# Patient Record
Sex: MALE | Employment: FULL TIME | URBAN - METROPOLITAN AREA
[De-identification: names, ages, dates, MRNs, and addresses within clinical notes are randomized per-mention and may not be internally consistent; named-entity substitution may affect disease eponyms.]

---

## 2024-06-14 PROBLEM — E66.01 MORBID OBESITY WITH BODY MASS INDEX (BMI) OF 40.0 TO 49.9 (MULTI): Status: ACTIVE | Noted: 2021-04-26

## 2024-06-14 PROBLEM — J45.40 MODERATE PERSISTENT ASTHMA WITHOUT COMPLICATION (HHS-HCC): Status: ACTIVE | Noted: 2021-01-06

## 2024-06-14 PROBLEM — J45.901 MODERATE ASTHMA WITH EXACERBATION (HHS-HCC): Status: ACTIVE | Noted: 2020-11-23

## 2024-06-14 PROBLEM — G56.20 CUBITAL TUNNEL SYNDROME: Status: ACTIVE | Noted: 2019-01-23

## 2024-06-14 PROBLEM — I48.0 PAROXYSMAL ATRIAL FIBRILLATION (MULTI): Status: ACTIVE | Noted: 2021-04-26

## 2024-06-14 PROBLEM — I10 ESSENTIAL HYPERTENSION: Status: ACTIVE | Noted: 2021-04-26

## 2024-06-14 PROBLEM — D18.00 GLOMUS TUMOR: Status: ACTIVE | Noted: 2021-02-15

## 2024-06-14 PROBLEM — G56.80: Status: ACTIVE | Noted: 2019-01-23

## 2024-06-14 PROBLEM — L40.4 GUTTATE PSORIASIS: Status: ACTIVE | Noted: 2021-07-12

## 2024-06-14 PROBLEM — S83.242A ACUTE MEDIAL MENISCUS TEAR OF LEFT KNEE: Status: ACTIVE | Noted: 2023-02-07

## 2024-06-14 PROBLEM — S62.009A: Status: ACTIVE | Noted: 2020-10-19

## 2024-06-14 PROBLEM — G47.33 OSA ON CPAP: Status: ACTIVE | Noted: 2021-04-26

## 2024-06-14 PROBLEM — M54.12 CERVICAL RADICULOPATHY: Status: ACTIVE | Noted: 2020-10-19

## 2024-06-14 RX ORDER — ICOSAPENT ETHYL 1 G/1
2 CAPSULE ORAL
COMMUNITY

## 2024-06-14 RX ORDER — HYDROCHLOROTHIAZIDE 25 MG/1
1 TABLET ORAL DAILY
COMMUNITY
Start: 2022-09-07

## 2024-06-14 RX ORDER — ALPRAZOLAM 0.5 MG/1
1 TABLET ORAL 2 TIMES DAILY
COMMUNITY
Start: 2022-09-07

## 2024-06-14 RX ORDER — SOTALOL HYDROCHLORIDE 80 MG/1
80 TABLET ORAL EVERY 12 HOURS
COMMUNITY

## 2024-06-14 RX ORDER — GLUCOSAMINE/MSM/CHONDROIT SULF 500-166.6
1 TABLET ORAL DAILY
COMMUNITY

## 2024-06-14 RX ORDER — AMLODIPINE BESYLATE 10 MG/1
10 TABLET ORAL DAILY
COMMUNITY

## 2024-06-14 RX ORDER — ROSUVASTATIN CALCIUM 20 MG/1
20 TABLET, COATED ORAL DAILY
COMMUNITY
Start: 2022-11-16

## 2024-06-14 RX ORDER — FLUTICASONE PROPIONATE AND SALMETEROL XINAFOATE 115; 21 UG/1; UG/1
2 AEROSOL, METERED RESPIRATORY (INHALATION) 2 TIMES DAILY
COMMUNITY
Start: 2023-08-07 | End: 2024-08-06

## 2024-06-14 RX ORDER — MELOXICAM 15 MG/1
15 TABLET ORAL DAILY
COMMUNITY

## 2024-06-14 RX ORDER — OMEPRAZOLE 40 MG/1
40 CAPSULE, DELAYED RELEASE ORAL
COMMUNITY
Start: 2022-10-24

## 2024-06-14 RX ORDER — ASPIRIN 325 MG
325 TABLET ORAL
COMMUNITY

## 2024-06-14 RX ORDER — LOSARTAN POTASSIUM 100 MG/1
1 TABLET ORAL DAILY
COMMUNITY
Start: 2022-07-08

## 2024-06-14 NOTE — PROGRESS NOTES
[facility] Heart and Vascular Electrophysiology    Patient Name: Kenroy Manriquez  Patient : 1970    Referred  for   Chief Complaint   Patient presents with    New Patient Visit        Kenroy Manriquez is a 53 y.o. year old male patient with    Paroxysmal atrial fibrillation: Diagnosed about 20 years ago. He would frequently need to go to the ER and receive IV cardizem. Never had a cardioversion. Started on sotalol 80mg bid and has had no issues in the past 12-14 years. No dizziness/lightheadedness/palpitations.  HTN  DLD      Past Medical History:  He has a past medical history of Asthma ([facility]), Atrial fibrillation (Multi), Crohn disease (Multi), GERD (gastroesophageal reflux disease), Hyperlipidemia, Hypertension, Osteoarthritis, and Sleep apnea.    Past Surgical History:  He has no past surgical history on file.      Social History:  He reports that he has never smoked. He has never used smokeless tobacco. He reports that he does not currently use alcohol. He reports that he does not use drugs.    Family History:  No family history on file.     Allergies:  Other    Outpatient Medications:  Current Outpatient Medications   Medication Instructions    ALPRAZolam (Xanax) 0.5 mg tablet 1 tablet, oral, 2 times daily    amLODIPine (NORVASC) 10 mg, oral, Daily    aspirin 325 mg, oral, Daily RT    fluticasone propion-salmeteroL (Advair) 115-21 mcg/actuation inhaler 2 puffs, inhalation, 2 times daily    hydroCHLOROthiazide (HYDRODiuril) 25 mg tablet 1 tablet, oral, Daily    icosapent ethyL (VASCEPA) 2 g, oral, 2 times daily (morning and late afternoon)    losartan (Cozaar) 100 mg tablet 1 tablet, oral, Daily    meloxicam (MOBIC) 15 mg, oral, Daily    omega 3-dha-epa-fish oil 360 mg-108 mg- 180 mg-1,200 mg capsule 1 tablet, oral, Daily    omeprazole (PRILOSEC) 40 mg, oral, Daily before breakfast    rosuvastatin (CRESTOR) 20 mg, oral, Daily    sotalol (BETAPACE) 80 mg, oral, Every 12 hours        ROS:  A 14 point  "review of systems was done and is negative other than as stated in HPI    Vitals:  Vitals:    06/27/24 1055   BP: 144/85   Pulse: 63   Weight: 104 kg (230 lb)   Height: 1.676 m (5' 6\")       Physical Exam:   Physical Exam  Constitutional:       General: He is not in acute distress.     Appearance: Normal appearance.   Cardiovascular:      Rate and Rhythm: Normal rate and regular rhythm.   Musculoskeletal:         General: No swelling.   Skin:     General: Skin is warm and dry.   Neurological:      Mental Status: He is alert.          Intake/Output:   No intake/output data recorded.    Outpatient Medications  Current Outpatient Medications on File Prior to Visit   Medication Sig Dispense Refill    ALPRAZolam (Xanax) 0.5 mg tablet Take 1 tablet (0.5 mg) by mouth 2 times a day.      amLODIPine (Norvasc) 10 mg tablet Take 1 tablet (10 mg) by mouth once daily.      aspirin 325 mg tablet Take 1 tablet (325 mg) by mouth once daily.      fluticasone propion-salmeteroL (Advair) 115-21 mcg/actuation inhaler Inhale 2 puffs 2 times a day.      hydroCHLOROthiazide (HYDRODiuril) 25 mg tablet Take 1 tablet (25 mg) by mouth once daily.      icosapent ethyL (Vascepa) 1 gram capsule Take 2 capsules (2 g) by mouth 2 times daily (morning and late afternoon).      losartan (Cozaar) 100 mg tablet Take 1 tablet (100 mg) by mouth once daily.      meloxicam (Mobic) 15 mg tablet Take 1 tablet (15 mg) by mouth once daily.      omega 3-dha-epa-fish oil 360 mg-108 mg- 180 mg-1,200 mg capsule Take 1 tablet by mouth once daily.      omeprazole (PriLOSEC) 40 mg DR capsule Take 1 capsule (40 mg) by mouth once daily in the morning. Take before meals.      rosuvastatin (Crestor) 20 mg tablet Take 1 tablet (20 mg) by mouth once daily.      sotalol (Betapace) 80 mg tablet Take 1 tablet (80 mg) by mouth every 12 hours.       No current facility-administered medications on file prior to visit.       Labs: (past 26 weeks)  No results found for this or any " previous visit (from the past 4368 hour(s)).    ECG  No results found for this or any previous visit (from the past 4464 hour(s)).    Echocardiogram  No results found for this or any previous visit from the past 1095 days.      Assessment/Plan:     Patient with prior history of PAF, has been on Sotalol for >12 years. No recurrences since. Establishing with new cardiology team and us. Since then he is using a CPAP and has lost significant weight. I think it is reasonable to attempt to stop sotalol. If there are recurrent arrhythmias we can always verify with monitor and restart.

## 2024-06-27 ENCOUNTER — APPOINTMENT (OUTPATIENT)
Dept: CARDIOLOGY | Facility: CLINIC | Age: 54
End: 2024-06-27
Payer: COMMERCIAL

## 2024-06-27 VITALS
DIASTOLIC BLOOD PRESSURE: 85 MMHG | BODY MASS INDEX: 36.96 KG/M2 | WEIGHT: 230 LBS | SYSTOLIC BLOOD PRESSURE: 144 MMHG | HEART RATE: 63 BPM | HEIGHT: 66 IN

## 2024-06-27 DIAGNOSIS — I48.0 PAROXYSMAL ATRIAL FIBRILLATION (MULTI): Primary | ICD-10-CM

## 2024-06-27 PROCEDURE — 99204 OFFICE O/P NEW MOD 45 MIN: CPT | Performed by: INTERNAL MEDICINE

## 2024-06-27 PROCEDURE — 3077F SYST BP >= 140 MM HG: CPT | Performed by: INTERNAL MEDICINE

## 2024-06-27 PROCEDURE — 3079F DIAST BP 80-89 MM HG: CPT | Performed by: INTERNAL MEDICINE

## 2024-06-27 PROCEDURE — 1036F TOBACCO NON-USER: CPT | Performed by: INTERNAL MEDICINE

## 2024-06-27 PROCEDURE — 93000 ELECTROCARDIOGRAM COMPLETE: CPT | Performed by: INTERNAL MEDICINE

## 2024-06-27 NOTE — PROGRESS NOTES
"  [facility] Heart and Vascular Electrophysiology    Patient Name: Kenroy Manriquez  Patient : 1970    Referred for      Kenroy Manriquez is a 53 y.o. year old male patient with        Past Medical History:  He has a past medical history of Asthma ([facility]), Atrial fibrillation (Multi), Crohn disease (Multi), GERD (gastroesophageal reflux disease), Hyperlipidemia, Hypertension, Osteoarthritis, and Sleep apnea.    Past Surgical History:  He has no past surgical history on file.      Social History:  He reports that he has never smoked. He has never used smokeless tobacco. He reports that he does not currently use alcohol. He reports that he does not use drugs.    Family History:  No family history on file.     Allergies:  Other    Outpatient Medications:  Current Outpatient Medications   Medication Instructions    ALPRAZolam (Xanax) 0.5 mg tablet 1 tablet, oral, 2 times daily    amLODIPine (NORVASC) 10 mg, oral, Daily    aspirin 325 mg, oral, Daily RT    fluticasone propion-salmeteroL (Advair) 115-21 mcg/actuation inhaler 2 puffs, inhalation, 2 times daily    hydroCHLOROthiazide (HYDRODiuril) 25 mg tablet 1 tablet, oral, Daily    icosapent ethyL (VASCEPA) 2 g, oral, 2 times daily (morning and late afternoon)    losartan (Cozaar) 100 mg tablet 1 tablet, oral, Daily    meloxicam (MOBIC) 15 mg, oral, Daily    omega 3-dha-epa-fish oil 360 mg-108 mg- 180 mg-1,200 mg capsule 1 tablet, oral, Daily    omeprazole (PRILOSEC) 40 mg, oral, Daily before breakfast    rosuvastatin (CRESTOR) 20 mg, oral, Daily    sotalol (BETAPACE) 80 mg, oral, Every 12 hours        ROS:  A 14 point review of systems was done and is negative other than as stated in HPI    Vitals:  There were no vitals filed for this visit.    Physical Exam     Labs:   No results found for: \"WBC\", \"HGB\", \"HCT\", \"PLT\", \"ALT\", \"AST\", \"NA\", \"K\", \"CL\", \"CREATININE\", \"BUN\", \"CO2\", \"TSH\", \"INR\", \"GLUF\"     Cardiac Testing:  ECG  No results found for this or any previous " visit (from the past 4464 hour(s)).    Echocardiogram      Assessment:       Plan:

## 2024-06-28 NOTE — PROGRESS NOTES
[facility] Heart and Vascular Electrophysiology    Patient Name: Kenroy Manriquez  Patient : 1970    Referred  for   Chief Complaint   Patient presents with    New Patient Visit        Kenroy Manriquez is a 53 y.o. year old male patient with    Paroxysmal atrial fibrillation: Diagnosed about 20 years ago. He would frequently need to go to the ER and receive IV cardizem. Never had a cardioversion. Started on sotalol 80mg bid and has had no issues in the past 12-14 years. No dizziness/lightheadedness/palpitations.  HTN  DLD      Past Medical History:  He has a past medical history of Asthma ([facility]), Atrial fibrillation (Multi), Crohn disease (Multi), GERD (gastroesophageal reflux disease), Hyperlipidemia, Hypertension, Osteoarthritis, and Sleep apnea.    Past Surgical History:  He has no past surgical history on file.      Social History:  He reports that he has never smoked. He has never used smokeless tobacco. He reports that he does not currently use alcohol. He reports that he does not use drugs.    Family History:  No family history on file.     Allergies:  Other    Outpatient Medications:  Current Outpatient Medications   Medication Instructions    ALPRAZolam (Xanax) 0.5 mg tablet 1 tablet, oral, 2 times daily    amLODIPine (NORVASC) 10 mg, oral, Daily    aspirin 325 mg, oral, Daily RT    fluticasone propion-salmeteroL (Advair) 115-21 mcg/actuation inhaler 2 puffs, inhalation, 2 times daily    hydroCHLOROthiazide (HYDRODiuril) 25 mg tablet 1 tablet, oral, Daily    icosapent ethyL (VASCEPA) 2 g, oral, 2 times daily (morning and late afternoon)    losartan (Cozaar) 100 mg tablet 1 tablet, oral, Daily    meloxicam (MOBIC) 15 mg, oral, Daily    omega 3-dha-epa-fish oil 360 mg-108 mg- 180 mg-1,200 mg capsule 1 tablet, oral, Daily    omeprazole (PRILOSEC) 40 mg, oral, Daily before breakfast    rosuvastatin (CRESTOR) 20 mg, oral, Daily        ROS:  A 14 point review of systems was done and is negative other than  "as stated in HPI    Vitals:  Vitals:    06/27/24 1055   BP: 144/85   Pulse: 63   Weight: 104 kg (230 lb)   Height: 1.676 m (5' 6\")       Physical Exam:   Physical Exam  Constitutional:       General: He is not in acute distress.     Appearance: Normal appearance.   Cardiovascular:      Rate and Rhythm: Normal rate and regular rhythm.   Musculoskeletal:         General: No swelling.   Skin:     General: Skin is warm and dry.   Neurological:      Mental Status: He is alert.          Intake/Output:   No intake/output data recorded.    Outpatient Medications  Current Outpatient Medications on File Prior to Visit   Medication Sig Dispense Refill    ALPRAZolam (Xanax) 0.5 mg tablet Take 1 tablet (0.5 mg) by mouth 2 times a day.      amLODIPine (Norvasc) 10 mg tablet Take 1 tablet (10 mg) by mouth once daily.      aspirin 325 mg tablet Take 1 tablet (325 mg) by mouth once daily.      fluticasone propion-salmeteroL (Advair) 115-21 mcg/actuation inhaler Inhale 2 puffs 2 times a day.      hydroCHLOROthiazide (HYDRODiuril) 25 mg tablet Take 1 tablet (25 mg) by mouth once daily.      icosapent ethyL (Vascepa) 1 gram capsule Take 2 capsules (2 g) by mouth 2 times daily (morning and late afternoon).      losartan (Cozaar) 100 mg tablet Take 1 tablet (100 mg) by mouth once daily.      meloxicam (Mobic) 15 mg tablet Take 1 tablet (15 mg) by mouth once daily.      omega 3-dha-epa-fish oil 360 mg-108 mg- 180 mg-1,200 mg capsule Take 1 tablet by mouth once daily.      omeprazole (PriLOSEC) 40 mg DR capsule Take 1 capsule (40 mg) by mouth once daily in the morning. Take before meals.      rosuvastatin (Crestor) 20 mg tablet Take 1 tablet (20 mg) by mouth once daily.      [DISCONTINUED] sotalol (Betapace) 80 mg tablet Take 1 tablet (80 mg) by mouth every 12 hours.       No current facility-administered medications on file prior to visit.       Labs: (past 26 weeks)  No results found for this or any previous visit (from the past 4368 " hour(s)).    ECG  Encounter Date: 06/27/24   ECG 12 lead (Clinic Performed)    Narrative    See scanned report       Echocardiogram  No results found for this or any previous visit from the past 1095 days.      Assessment/Plan:     Patient with prior history of PAF, has been on Sotalol for >12 years. No recurrences since. Establishing with new cardiology team and us. Since then he is using a CPAP and has lost significant weight. I think it is reasonable to attempt to stop sotalol. If there are recurrent arrhythmias we can always verify with monitor and restart.

## 2024-09-26 ENCOUNTER — APPOINTMENT (OUTPATIENT)
Dept: CARDIOLOGY | Facility: CLINIC | Age: 54
End: 2024-09-26
Payer: COMMERCIAL

## 2025-07-01 ENCOUNTER — TELEPHONE (OUTPATIENT)
Dept: CARDIOLOGY | Facility: HOSPITAL | Age: 55
End: 2025-07-01
Payer: COMMERCIAL

## 2025-07-01 NOTE — TELEPHONE ENCOUNTER
NINAM for pt to schedule EP referral w/ Dr. Pierce.     Will have records/referral scanned to media.    7/2 - Pt returned my call and is agreeable to see Dr. Pierce 7/31 9:30am [facility].

## 2025-07-31 ENCOUNTER — APPOINTMENT (OUTPATIENT)
Dept: CARDIOLOGY | Facility: CLINIC | Age: 55
End: 2025-07-31
Payer: COMMERCIAL

## 2025-07-31 ENCOUNTER — TELEPHONE (OUTPATIENT)
Dept: CARDIOLOGY | Facility: CLINIC | Age: 55
End: 2025-07-31

## 2025-07-31 VITALS
SYSTOLIC BLOOD PRESSURE: 122 MMHG | BODY MASS INDEX: 36.32 KG/M2 | WEIGHT: 226 LBS | HEIGHT: 66 IN | DIASTOLIC BLOOD PRESSURE: 78 MMHG | HEART RATE: 73 BPM

## 2025-07-31 DIAGNOSIS — I48.0 PAROXYSMAL ATRIAL FIBRILLATION (MULTI): Primary | ICD-10-CM

## 2025-07-31 PROCEDURE — 1036F TOBACCO NON-USER: CPT | Performed by: INTERNAL MEDICINE

## 2025-07-31 PROCEDURE — 93000 ELECTROCARDIOGRAM COMPLETE: CPT | Performed by: INTERNAL MEDICINE

## 2025-07-31 PROCEDURE — 3074F SYST BP LT 130 MM HG: CPT | Performed by: INTERNAL MEDICINE

## 2025-07-31 PROCEDURE — 99214 OFFICE O/P EST MOD 30 MIN: CPT | Performed by: INTERNAL MEDICINE

## 2025-07-31 PROCEDURE — 3008F BODY MASS INDEX DOCD: CPT | Performed by: INTERNAL MEDICINE

## 2025-07-31 PROCEDURE — 3078F DIAST BP <80 MM HG: CPT | Performed by: INTERNAL MEDICINE

## 2025-07-31 RX ORDER — SOTALOL HYDROCHLORIDE 80 MG/1
80 TABLET ORAL EVERY 12 HOURS
COMMUNITY
End: 2025-07-31 | Stop reason: ALTCHOICE

## 2025-07-31 NOTE — PROGRESS NOTES
"  Subjective:  The patient is a 54-year-old white male referred again by Dr. Jordan Garza to discuss management of paroxysmal atrial fibrillation.  The patient has a history of hypertension, hyperlipidemia, degenerative joint disease, gastroesophageal reflux disease, Crohn's disease, asthma, and obstructive sleep apnea.  He has been on CPAP therapy since around 2022.    The patient reports that he has had paroxysmal atrial fibrillation dating back to around 2000.  On 3 or 4 occasions, he presented to the hospital in fibrillation and was placed on intravenous diltiazem and spontaneously converted.  He was treated with sotalol and has now been on this for close to 24 years at 80 mg p.o. twice daily.  He has not had a documented recurrence of fibrillation since around 2012.  The patient saw Dr. Demarcus Jerome in June 2024, who recommended discontinuation of the sotalol to see if atrial fibrillation truly recurred, particularly with good treatment of his sleep apnea.  The patient stopped 2 doses but then began having palpitations and elected to go back on the therapy on his own.  The patient is not known to have any coronary disease, with a negative stress test (\"about 5 years ago\").    The patient was  and then remarried in 2017.  His second wife is a good cook, and he gained a lot of weight and eventually reach 300 pounds.  With dietary changes and exercise, he has lost roughly 70 pounds in the past few years.    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    Current Outpatient Medications   Medication Sig    ALPRAZolam (Xanax) 0.5 mg tablet Take 1 tablet (0.5 mg) by mouth 2 times a day.    amLODIPine (Norvasc) 10 mg tablet Take 1 tablet (10 mg) by mouth once daily.    hydroCHLOROthiazide (HYDRODiuril) 25 mg tablet Take 1 tablet (25 mg) by mouth once daily.    losartan (Cozaar) 100 mg tablet Take 1 tablet (100 mg) by mouth once daily.    meloxicam (Mobic) " "15 mg tablet Take 1 tablet (15 mg) by mouth once daily.    omega 3-dha-epa-fish oil 360 mg-108 mg- 180 mg-1,200 mg capsule Take 1 tablet by mouth once daily.    omeprazole (PriLOSEC) 40 mg DR capsule Take 1 capsule (40 mg) by mouth once daily in the morning. Take before meals.    rosuvastatin (Crestor) 20 mg tablet Take 1 tablet (20 mg) by mouth once daily.    sotalol (Betapace) 80 mg tablet Take 1 tablet (80 mg) by mouth every 12 hours.    aspirin 325 mg tablet Take 1 tablet (325 mg) by mouth once daily. (Patient not taking: Reported on 7/31/2025)    fluticasone propion-salmeteroL (Advair) 115-21 mcg/actuation inhaler Inhale 2 puffs 2 times a day.    icosapent ethyL (Vascepa) 1 gram capsule Take 2 capsules (2 g) by mouth 2 times daily (morning and late afternoon). (Patient not taking: Reported on 7/31/2025)     Allergies:  Other     Patient Active Problem List  Diagnosis    Acute medial meniscus tear of left knee    Cervical radiculopathy    Cubital tunnel syndrome    De Quervain's fracture    Essential hypertension    Glomus tumor    Guttate psoriasis    Moderate asthma with exacerbation ([facility])    Moderate persistent asthma without complication ([facility])    Morbid obesity with body mass index (BMI) of 40.0 to 49.9 (Multi)    SUGAR on CPAP    Other specified mononeuropathies of unspecified upper limb    Paroxysmal atrial fibrillation (Multi)     No pertinent surgical history.     Objective:  Vitals:    07/31/25 0928   BP: 122/78   Pulse: 73   Height:     1.676 m (5' 6\")  Weight: 103 kg (226 lb)     Exam:  Gen: Healthy appearing stocky gentleman in no distress.  HEENT: Remarkable for shaved head.  Neck: No jugular venous distention or thyromegaly.  Lungs: Clear to auscultation, with no wheezes or rales.  Heart: Regular rhythm without extrasystoles, murmurs or gallops.  Abdomen: Benign, with no organomegaly or masses.  Extremities: Intact distal pulses; no edema.  Neuro: No focal neurologic abnormalities.  Skin: No " cutaneous lesions.    EKG: An EKG on 7/31/2025 showed sinus rhythm at 73 bpm with an early repolarization pattern, and normal corrected QT of 412 ms    Impressions:  1.  Longstanding hypertension, controlled.  2.  Overweight status, with known obstructive sleep apnea, on CPAP therapy.  3.  Paroxysmal atrial fibrillation, documented as far back as 2000, and maintaining sinus rhythm on sotalol therapy.  There is certainly some risk to being on this medication long-term, however, particularly if any renal insufficiency occurs due to nephrolithiasis, infection, or hypovolemia.  The patient has a [id number] score of just 1 (hypertension), and is not on anticoagulation at present.  4.  Other medical problems, including hyperlipidemia, asthma, degenerative joint disease, gastroesophageal reflux disease, and Crohn's disease, stable.    Recommendations:  1.  I agree that indefinite sotalol therapy is likely not recommended in this gentleman, since it is unclear whether he even needs this medication after good obstructive sleep apnea treatment.  2.  He will be fitted with a 1 week event monitor, and will discontinue his sotalol this evening.  3.  He will follow-up in a few months to go over his event monitor findings and whether there is any burden whatsoever of recurrent atrial fibrillation.  If so, I would recommend pulmonary vein isolation per Dr. Flaco Muse rather than hospitalization for resumption of sotalol.  The patient would need to go on full anticoagulation for 1 month prior to such a procedure.  4.  If the patient's event monitor shows no atrial fibrillation at all, he will simply be followed clinically for any atrial fibrillation recurrences.      Anthony Pierce MD

## 2025-08-18 ENCOUNTER — ANCILLARY PROCEDURE (OUTPATIENT)
Dept: CARDIOLOGY | Facility: HOSPITAL | Age: 55
End: 2025-08-18
Payer: COMMERCIAL

## 2025-08-18 DIAGNOSIS — I48.0 PAROXYSMAL ATRIAL FIBRILLATION (MULTI): ICD-10-CM

## 2025-08-18 PROCEDURE — 93242 EXT ECG>48HR<7D RECORDING: CPT

## 2025-08-19 PROCEDURE — 93244 EXT ECG>48HR<7D REV&INTERPJ: CPT | Performed by: INTERNAL MEDICINE

## 2025-10-09 ENCOUNTER — APPOINTMENT (OUTPATIENT)
Dept: CARDIOLOGY | Facility: CLINIC | Age: 55
End: 2025-10-09
Payer: COMMERCIAL